# Patient Record
Sex: FEMALE | Race: WHITE
[De-identification: names, ages, dates, MRNs, and addresses within clinical notes are randomized per-mention and may not be internally consistent; named-entity substitution may affect disease eponyms.]

---

## 2018-04-11 ENCOUNTER — HOSPITAL ENCOUNTER (OUTPATIENT)
Age: 53
Discharge: HOME | End: 2018-04-11

## 2018-04-11 ENCOUNTER — HOSPITAL ENCOUNTER (OUTPATIENT)
Dept: HOSPITAL 91 - GIL | Age: 53
Discharge: HOME | End: 2018-04-11
Payer: COMMERCIAL

## 2018-04-11 DIAGNOSIS — E78.5: ICD-10-CM

## 2018-04-11 DIAGNOSIS — D12.4: ICD-10-CM

## 2018-04-11 DIAGNOSIS — E66.01: ICD-10-CM

## 2018-04-11 DIAGNOSIS — Z12.11: Primary | ICD-10-CM

## 2018-04-11 DIAGNOSIS — K29.50: ICD-10-CM

## 2018-04-11 DIAGNOSIS — I10: ICD-10-CM

## 2018-04-11 PROCEDURE — 88305 TISSUE EXAM BY PATHOLOGIST: CPT

## 2018-04-11 PROCEDURE — 88312 SPECIAL STAINS GROUP 1: CPT

## 2018-04-11 PROCEDURE — 43239 EGD BIOPSY SINGLE/MULTIPLE: CPT

## 2022-08-28 ENCOUNTER — HOSPITAL ENCOUNTER (EMERGENCY)
Dept: HOSPITAL 54 - ER | Age: 57
Discharge: HOME | End: 2022-08-28
Payer: MEDICAID

## 2022-08-28 VITALS — SYSTOLIC BLOOD PRESSURE: 161 MMHG | DIASTOLIC BLOOD PRESSURE: 92 MMHG

## 2022-08-28 VITALS — HEIGHT: 56 IN | BODY MASS INDEX: 47.24 KG/M2 | WEIGHT: 210 LBS
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
## 2022-08-28 DIAGNOSIS — I10: ICD-10-CM
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
## 2022-08-28 DIAGNOSIS — R07.81: Primary | ICD-10-CM
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
## 2022-08-28 NOTE — NUR
BIBFAMILY C/O RIGHT RIB PAIN  X2DAYS S/P SLIP AND FALL, DENIES HITTING HEAD 
DENIES LOC, LANDED ON BACK. RATES PAIN 8/10. WILL CONTINUE TO MONITOR THE 
PATIENT.